# Patient Record
Sex: MALE | Race: BLACK OR AFRICAN AMERICAN | NOT HISPANIC OR LATINO | Employment: FULL TIME | URBAN - METROPOLITAN AREA
[De-identification: names, ages, dates, MRNs, and addresses within clinical notes are randomized per-mention and may not be internally consistent; named-entity substitution may affect disease eponyms.]

---

## 2022-08-04 ENCOUNTER — HOSPITAL ENCOUNTER (OUTPATIENT)
Dept: RADIOLOGY | Facility: CLINIC | Age: 52
Discharge: HOME | End: 2022-08-04
Attending: ORTHOPAEDIC SURGERY
Payer: COMMERCIAL

## 2022-08-04 ENCOUNTER — OFFICE VISIT (OUTPATIENT)
Dept: ORTHOPEDICS | Facility: CLINIC | Age: 52
End: 2022-08-04
Payer: COMMERCIAL

## 2022-08-04 VITALS — HEIGHT: 71 IN | BODY MASS INDEX: 36.49 KG/M2 | WEIGHT: 260.63 LBS

## 2022-08-04 DIAGNOSIS — M25.512 LEFT SHOULDER PAIN, UNSPECIFIED CHRONICITY: ICD-10-CM

## 2022-08-04 DIAGNOSIS — M25.512 LEFT SHOULDER PAIN, UNSPECIFIED CHRONICITY: Primary | ICD-10-CM

## 2022-08-04 DIAGNOSIS — M75.42 IMPINGEMENT SYNDROME OF LEFT SHOULDER: ICD-10-CM

## 2022-08-04 DIAGNOSIS — M75.112 NONTRAUMATIC INCOMPLETE TEAR OF LEFT ROTATOR CUFF: ICD-10-CM

## 2022-08-04 PROCEDURE — 99204 PR OFFICE/OUTPT VISIT, NEW, LEVL IV, 45-59 MIN: ICD-10-PCS | Mod: 25,,, | Performed by: ORTHOPAEDIC SURGERY

## 2022-08-04 PROCEDURE — 1160F PR REVIEW ALL MEDS BY PRESCRIBER/CLIN PHARMACIST DOCUMENTED: ICD-10-PCS | Mod: CPTII,,, | Performed by: ORTHOPAEDIC SURGERY

## 2022-08-04 PROCEDURE — 1159F MED LIST DOCD IN RCRD: CPT | Mod: CPTII,,, | Performed by: ORTHOPAEDIC SURGERY

## 2022-08-04 PROCEDURE — 1160F RVW MEDS BY RX/DR IN RCRD: CPT | Mod: CPTII,,, | Performed by: ORTHOPAEDIC SURGERY

## 2022-08-04 PROCEDURE — 73030 X-RAY EXAM OF SHOULDER: CPT | Mod: LT,,, | Performed by: ORTHOPAEDIC SURGERY

## 2022-08-04 PROCEDURE — 1159F PR MEDICATION LIST DOCUMENTED IN MEDICAL RECORD: ICD-10-PCS | Mod: CPTII,,, | Performed by: ORTHOPAEDIC SURGERY

## 2022-08-04 PROCEDURE — 4010F ACE/ARB THERAPY RXD/TAKEN: CPT | Mod: CPTII,,, | Performed by: ORTHOPAEDIC SURGERY

## 2022-08-04 PROCEDURE — 4010F PR ACE/ARB THEARPY RXD/TAKEN: ICD-10-PCS | Mod: CPTII,,, | Performed by: ORTHOPAEDIC SURGERY

## 2022-08-04 PROCEDURE — 99204 OFFICE O/P NEW MOD 45 MIN: CPT | Mod: 25,,, | Performed by: ORTHOPAEDIC SURGERY

## 2022-08-04 PROCEDURE — 73030 XR SHOULDER COMPLETE 2 OR MORE VIEWS LEFT: ICD-10-PCS | Mod: LT,,, | Performed by: ORTHOPAEDIC SURGERY

## 2022-08-04 PROCEDURE — 20610 DRAIN/INJ JOINT/BURSA W/O US: CPT | Mod: LT,,, | Performed by: ORTHOPAEDIC SURGERY

## 2022-08-04 PROCEDURE — 20610 LARGE JOINT ASPIRATION/INJECTION: L SUBACROMIAL BURSA: ICD-10-PCS | Mod: LT,,, | Performed by: ORTHOPAEDIC SURGERY

## 2022-08-04 PROCEDURE — 3008F BODY MASS INDEX DOCD: CPT | Mod: CPTII,,, | Performed by: ORTHOPAEDIC SURGERY

## 2022-08-04 PROCEDURE — 3008F PR BODY MASS INDEX (BMI) DOCUMENTED: ICD-10-PCS | Mod: CPTII,,, | Performed by: ORTHOPAEDIC SURGERY

## 2022-08-04 RX ORDER — HYDROCODONE BITARTRATE AND ACETAMINOPHEN 10; 325 MG/1; MG/1
1 TABLET ORAL EVERY 8 HOURS PRN
COMMUNITY
Start: 2022-07-19

## 2022-08-04 RX ORDER — AMLODIPINE AND OLMESARTAN MEDOXOMIL 5; 20 MG/1; MG/1
1 TABLET ORAL DAILY
COMMUNITY
Start: 2022-07-20

## 2022-08-04 RX ORDER — OLOPATADINE HYDROCHLORIDE 1 MG/ML
1 SOLUTION/ DROPS OPHTHALMIC 2 TIMES DAILY
COMMUNITY
Start: 2022-03-04

## 2022-08-04 RX ORDER — METFORMIN HYDROCHLORIDE 500 MG/1
500 TABLET ORAL DAILY
COMMUNITY
Start: 2022-05-10

## 2022-08-04 RX ORDER — PRAMIPEXOLE DIHYDROCHLORIDE 1.5 MG/1
1.5 TABLET ORAL NIGHTLY
COMMUNITY
Start: 2022-02-17

## 2022-08-04 RX ORDER — FLUTICASONE PROPIONATE 50 MCG
2 SPRAY, SUSPENSION (ML) NASAL DAILY
COMMUNITY
Start: 2022-02-07

## 2022-08-04 RX ORDER — GLIMEPIRIDE 2 MG/1
2 TABLET ORAL EVERY MORNING
COMMUNITY
Start: 2022-05-26

## 2022-08-04 RX ORDER — CHOLECALCIFEROL (VITAMIN D3) 1250 MCG
50000 CAPSULE ORAL
COMMUNITY
Start: 2022-05-08

## 2022-08-04 RX ORDER — ATORVASTATIN CALCIUM 20 MG/1
20 TABLET, FILM COATED ORAL DAILY
COMMUNITY
Start: 2022-05-10

## 2022-08-04 RX ORDER — LIDOCAINE HYDROCHLORIDE 20 MG/ML
3 INJECTION, SOLUTION INFILTRATION; PERINEURAL
Status: DISCONTINUED | OUTPATIENT
Start: 2022-08-04 | End: 2022-08-04 | Stop reason: HOSPADM

## 2022-08-04 RX ORDER — ROPINIROLE 1 MG/1
1 TABLET, FILM COATED ORAL DAILY
COMMUNITY
Start: 2022-04-22

## 2022-08-04 RX ORDER — CYCLOBENZAPRINE HCL 10 MG
10 TABLET ORAL EVERY 8 HOURS PRN
COMMUNITY
Start: 2022-07-19

## 2022-08-04 RX ORDER — MONTELUKAST SODIUM 10 MG/1
10 TABLET ORAL DAILY
COMMUNITY
Start: 2022-02-07

## 2022-08-04 RX ORDER — BETAMETHASONE SODIUM PHOSPHATE AND BETAMETHASONE ACETATE 3; 3 MG/ML; MG/ML
12 INJECTION, SUSPENSION INTRA-ARTICULAR; INTRALESIONAL; INTRAMUSCULAR; SOFT TISSUE
Status: DISCONTINUED | OUTPATIENT
Start: 2022-08-04 | End: 2022-08-04 | Stop reason: HOSPADM

## 2022-08-04 RX ORDER — LEVOCETIRIZINE DIHYDROCHLORIDE 5 MG/1
5 TABLET, FILM COATED ORAL DAILY
COMMUNITY
Start: 2022-02-07

## 2022-08-04 RX ADMIN — BETAMETHASONE SODIUM PHOSPHATE AND BETAMETHASONE ACETATE 12 MG: 3; 3 INJECTION, SUSPENSION INTRA-ARTICULAR; INTRALESIONAL; INTRAMUSCULAR; SOFT TISSUE at 08:08

## 2022-08-04 RX ADMIN — LIDOCAINE HYDROCHLORIDE 3 MG: 20 INJECTION, SOLUTION INFILTRATION; PERINEURAL at 08:08

## 2022-08-04 NOTE — PROCEDURES
Large Joint Aspiration/Injection: L subacromial bursa    Date/Time: 8/4/2022 8:00 AM  Performed by: Guru Urban MD  Authorized by: Guru Urban MD     Consent Done?:  Yes (Verbal)  Indications:  Pain  Site marked: the procedure site was marked    Timeout: prior to procedure the correct patient, procedure, and site was verified    Prep: patient was prepped and draped in usual sterile fashion    Approach:  Posterior  Location:  Shoulder  Site:  L subacromial bursa  Medications:  12 mg betamethasone acetate-betamethasone sodium phosphate 6 mg/mL; 3 mg LIDOcaine HCL 20 mg/ml (2%) 20 mg/mL (2 %)  Patient tolerance:  Patient tolerated the procedure well with no immediate complications

## 2022-08-04 NOTE — PROGRESS NOTES
Subjective:    CC: Shoulder Pain (LEFT SHOULDER PAIN, HAS BEEN ONGOING FOR A WHILE, DENIES INJURY, SX WAS DONE IN 2020 WITH DR. MARK HAYNES, LIMITED ROM, ALTERNATE WITH HEAT AND ICE TO HELP RELIEF PAIN )       HPI:  Patient comes in today for his 1st visit.  Patient had a previous left shoulder rotator cuff repair 2 years ago.  He states he never really fully regained his full motion.  He continues to have limited motion.  Patient states more recently in the last 8 or 9 months it has gotten worse.  He was seen by another physician as well, had an MRI in 3-4 months ago.  Unfortunately there is no results today.  He continues to have pain and loss of motion of his left shoulder.    ROS: Refer to HPI for pertinent ROS. All other 12 point systems negative.    Objective:    Physical Exam:  Patient is well-nourished and well-developed, in no apparent distress, pleasant and cooperative. Examination of the left upper extremity compartments are soft and warm.  Skin is intact. There are no signs or symptoms of DVT or infection.   Patient is tender to palpation along the  anterolateral aspect .  Patient is able to forward flex and abduct to 95°.  Positive Linton and Neers, positive empty can, negative drop arm test. Negative sulcus sign. Stable to stressing. Neurovascularly intact distally.    Images:  X-rays three views left shoulder demonstrates a previous distal clavicle excision, no obvious fracture dislocation, underlying impingement. Images Reviewed and discussed with patient.    Assessment:  1. Left shoulder pain, unspecified chronicity  - X-Ray Shoulder 2 or More Views Left; Future    2. Impingement syndrome of left shoulder  - Large Joint Aspiration/Injection: L subacromial bursa  - betamethasone acetate-betamethasone sodium phosphate injection 12 mg  - LIDOcaine HCL 20 mg/ml (2%) injection 3 mg    3. Nontraumatic incomplete tear of left rotator cuff  - Large Joint Aspiration/Injection: L subacromial bursa  -  betamethasone acetate-betamethasone sodium phosphate injection 12 mg  - LIDOcaine HCL 20 mg/ml (2%) injection 3 mg        Plan:  At this time we discussed his physical exam and x-ray findings.  We will get his MRI report, most recently, in call him with his results.  The meantime he has tolerated his injection very well to the left shoulder.  We have discussed some shoulder exercises.  Otherwise I would like see him back in a total of 4 weeks to see how he is progressing.    Follow UP: Follow up in about 4 weeks (around 9/1/2022).    Large Joint Aspiration/Injection: L subacromial bursa    Date/Time: 8/4/2022 8:00 AM  Performed by: Guru Urban MD  Authorized by: Guru Urban MD     Consent Done?:  Yes (Verbal)  Indications:  Pain  Site marked: the procedure site was marked    Timeout: prior to procedure the correct patient, procedure, and site was verified    Prep: patient was prepped and draped in usual sterile fashion    Approach:  Posterior  Location:  Shoulder  Site:  L subacromial bursa  Medications:  12 mg betamethasone acetate-betamethasone sodium phosphate 6 mg/mL; 3 mg LIDOcaine HCL 20 mg/ml (2%) 20 mg/mL (2 %)  Patient tolerance:  Patient tolerated the procedure well with no immediate complications

## 2022-09-01 ENCOUNTER — OFFICE VISIT (OUTPATIENT)
Dept: ORTHOPEDICS | Facility: CLINIC | Age: 52
End: 2022-09-01
Payer: COMMERCIAL

## 2022-09-01 VITALS — BODY MASS INDEX: 36.4 KG/M2 | HEIGHT: 71 IN | WEIGHT: 260 LBS

## 2022-09-01 DIAGNOSIS — M75.42 IMPINGEMENT SYNDROME OF LEFT SHOULDER: Primary | ICD-10-CM

## 2022-09-01 DIAGNOSIS — M75.112 NONTRAUMATIC INCOMPLETE TEAR OF LEFT ROTATOR CUFF: ICD-10-CM

## 2022-09-01 PROCEDURE — 4010F PR ACE/ARB THEARPY RXD/TAKEN: ICD-10-PCS | Mod: CPTII,,, | Performed by: ORTHOPAEDIC SURGERY

## 2022-09-01 PROCEDURE — 99213 PR OFFICE/OUTPT VISIT, EST, LEVL III, 20-29 MIN: ICD-10-PCS | Mod: ,,, | Performed by: ORTHOPAEDIC SURGERY

## 2022-09-01 PROCEDURE — 1159F MED LIST DOCD IN RCRD: CPT | Mod: CPTII,,, | Performed by: ORTHOPAEDIC SURGERY

## 2022-09-01 PROCEDURE — 1160F PR REVIEW ALL MEDS BY PRESCRIBER/CLIN PHARMACIST DOCUMENTED: ICD-10-PCS | Mod: CPTII,,, | Performed by: ORTHOPAEDIC SURGERY

## 2022-09-01 PROCEDURE — 1160F RVW MEDS BY RX/DR IN RCRD: CPT | Mod: CPTII,,, | Performed by: ORTHOPAEDIC SURGERY

## 2022-09-01 PROCEDURE — 3008F PR BODY MASS INDEX (BMI) DOCUMENTED: ICD-10-PCS | Mod: CPTII,,, | Performed by: ORTHOPAEDIC SURGERY

## 2022-09-01 PROCEDURE — 4010F ACE/ARB THERAPY RXD/TAKEN: CPT | Mod: CPTII,,, | Performed by: ORTHOPAEDIC SURGERY

## 2022-09-01 PROCEDURE — 3008F BODY MASS INDEX DOCD: CPT | Mod: CPTII,,, | Performed by: ORTHOPAEDIC SURGERY

## 2022-09-01 PROCEDURE — 1159F PR MEDICATION LIST DOCUMENTED IN MEDICAL RECORD: ICD-10-PCS | Mod: CPTII,,, | Performed by: ORTHOPAEDIC SURGERY

## 2022-09-01 PROCEDURE — 99213 OFFICE O/P EST LOW 20 MIN: CPT | Mod: ,,, | Performed by: ORTHOPAEDIC SURGERY

## 2022-09-01 RX ORDER — MELOXICAM 15 MG/1
15 TABLET ORAL DAILY
Qty: 30 TABLET | Refills: 0 | Status: SHIPPED | OUTPATIENT
Start: 2022-09-01

## 2022-09-01 NOTE — PROGRESS NOTES
Subjective:    CC: Pain of the Left Shoulder and Follow-up (L shoulder inj 8/4/22 - pt states that his shoulder is feeling a little better since the inj - td)       HPI:  Patient returns today for repeat exam.  We have discussed his previous MRI.  He states he is improving    ROS: Refer to HPI for pertinent ROS. All other 12 point systems negative.    Objective:    Physical Exam:  Patient is well-nourished and well-developed, in no apparent distress, pleasant and cooperative. Examination of the left upper extremity compartments are soft and warm.  Skin is intact. There are no signs or symptoms of DVT or infection.   Patient is tender to palpation along the  lateral aspect .  Patient is able to forward flex and abduct to 140.  Mildly positive Linton and Neers, negative empty can, negative drop arm test. Negative sulcus sign. Stable to stressing. Neurovascularly intact distally.    Images: . Images Reviewed and discussed with patient.    Assessment:  1. Impingement syndrome of left shoulder    2. Nontraumatic incomplete tear of left rotator cuff        Plan:  At this time we discussed his physical exam and previous imaging findings.  Patient continues to improve with conservative treatment.  We will continue to watch him closely.  I would like see him back and 2 months to see how he is progressing.  We have discussed anti-inflammatories with appropriate precautions as well.    Follow UP: No follow-ups on file.

## 2022-11-03 ENCOUNTER — OFFICE VISIT (OUTPATIENT)
Dept: ORTHOPEDICS | Facility: CLINIC | Age: 52
End: 2022-11-03
Payer: COMMERCIAL

## 2022-11-03 VITALS
BODY MASS INDEX: 34.02 KG/M2 | SYSTOLIC BLOOD PRESSURE: 129 MMHG | HEIGHT: 71 IN | DIASTOLIC BLOOD PRESSURE: 83 MMHG | HEART RATE: 73 BPM | WEIGHT: 243 LBS

## 2022-11-03 DIAGNOSIS — M75.112 NONTRAUMATIC INCOMPLETE TEAR OF LEFT ROTATOR CUFF: ICD-10-CM

## 2022-11-03 DIAGNOSIS — M75.42 IMPINGEMENT SYNDROME OF LEFT SHOULDER: Primary | ICD-10-CM

## 2022-11-03 PROCEDURE — 1160F PR REVIEW ALL MEDS BY PRESCRIBER/CLIN PHARMACIST DOCUMENTED: ICD-10-PCS | Mod: CPTII,,, | Performed by: ORTHOPAEDIC SURGERY

## 2022-11-03 PROCEDURE — 3079F DIAST BP 80-89 MM HG: CPT | Mod: CPTII,,, | Performed by: ORTHOPAEDIC SURGERY

## 2022-11-03 PROCEDURE — 1159F PR MEDICATION LIST DOCUMENTED IN MEDICAL RECORD: ICD-10-PCS | Mod: CPTII,,, | Performed by: ORTHOPAEDIC SURGERY

## 2022-11-03 PROCEDURE — 4010F ACE/ARB THERAPY RXD/TAKEN: CPT | Mod: CPTII,,, | Performed by: ORTHOPAEDIC SURGERY

## 2022-11-03 PROCEDURE — 3008F PR BODY MASS INDEX (BMI) DOCUMENTED: ICD-10-PCS | Mod: CPTII,,, | Performed by: ORTHOPAEDIC SURGERY

## 2022-11-03 PROCEDURE — 3074F SYST BP LT 130 MM HG: CPT | Mod: CPTII,,, | Performed by: ORTHOPAEDIC SURGERY

## 2022-11-03 PROCEDURE — 20610 LARGE JOINT ASPIRATION/INJECTION: L SUBACROMIAL BURSA: ICD-10-PCS | Mod: LT,,, | Performed by: ORTHOPAEDIC SURGERY

## 2022-11-03 PROCEDURE — 3008F BODY MASS INDEX DOCD: CPT | Mod: CPTII,,, | Performed by: ORTHOPAEDIC SURGERY

## 2022-11-03 PROCEDURE — 4010F PR ACE/ARB THEARPY RXD/TAKEN: ICD-10-PCS | Mod: CPTII,,, | Performed by: ORTHOPAEDIC SURGERY

## 2022-11-03 PROCEDURE — 99213 PR OFFICE/OUTPT VISIT, EST, LEVL III, 20-29 MIN: ICD-10-PCS | Mod: 25,,, | Performed by: ORTHOPAEDIC SURGERY

## 2022-11-03 PROCEDURE — 99213 OFFICE O/P EST LOW 20 MIN: CPT | Mod: 25,,, | Performed by: ORTHOPAEDIC SURGERY

## 2022-11-03 PROCEDURE — 20610 DRAIN/INJ JOINT/BURSA W/O US: CPT | Mod: LT,,, | Performed by: ORTHOPAEDIC SURGERY

## 2022-11-03 PROCEDURE — 1159F MED LIST DOCD IN RCRD: CPT | Mod: CPTII,,, | Performed by: ORTHOPAEDIC SURGERY

## 2022-11-03 PROCEDURE — 3074F PR MOST RECENT SYSTOLIC BLOOD PRESSURE < 130 MM HG: ICD-10-PCS | Mod: CPTII,,, | Performed by: ORTHOPAEDIC SURGERY

## 2022-11-03 PROCEDURE — 3079F PR MOST RECENT DIASTOLIC BLOOD PRESSURE 80-89 MM HG: ICD-10-PCS | Mod: CPTII,,, | Performed by: ORTHOPAEDIC SURGERY

## 2022-11-03 PROCEDURE — 1160F RVW MEDS BY RX/DR IN RCRD: CPT | Mod: CPTII,,, | Performed by: ORTHOPAEDIC SURGERY

## 2022-11-03 RX ORDER — FLUTICASONE FUROATE 50 UG/1
1 POWDER RESPIRATORY (INHALATION) DAILY
COMMUNITY
Start: 2022-10-28

## 2022-11-03 RX ORDER — LIDOCAINE HYDROCHLORIDE 20 MG/ML
3 INJECTION, SOLUTION INFILTRATION; PERINEURAL
Status: DISCONTINUED | OUTPATIENT
Start: 2022-11-03 | End: 2022-11-03 | Stop reason: HOSPADM

## 2022-11-03 RX ORDER — BETAMETHASONE SODIUM PHOSPHATE AND BETAMETHASONE ACETATE 3; 3 MG/ML; MG/ML
12 INJECTION, SUSPENSION INTRA-ARTICULAR; INTRALESIONAL; INTRAMUSCULAR; SOFT TISSUE
Status: DISCONTINUED | OUTPATIENT
Start: 2022-11-03 | End: 2022-11-03 | Stop reason: HOSPADM

## 2022-11-03 RX ADMIN — BETAMETHASONE SODIUM PHOSPHATE AND BETAMETHASONE ACETATE 12 MG: 3; 3 INJECTION, SUSPENSION INTRA-ARTICULAR; INTRALESIONAL; INTRAMUSCULAR; SOFT TISSUE at 09:11

## 2022-11-03 RX ADMIN — LIDOCAINE HYDROCHLORIDE 3 MG: 20 INJECTION, SOLUTION INFILTRATION; PERINEURAL at 09:11

## 2022-11-03 NOTE — PROGRESS NOTES
"Subjective:    CC: Injections of the Left Shoulder (Left shoulder inj 8/4/22, pt having some pain says he's ready for another injection )       HPI:  Patient returns today for repeat exam.  Patient states overall he continues to improve with his left shoulder.  He has had previous shoulder surgery, outside facility.  He has also had a previous follow-up MRI, outside facility.  He has been going to physical therapy making progress.  He would like to repeat his injection today.    ROS: Refer to HPI for pertinent ROS. All other 12 point systems negative.    Objective:  Vitals:    11/03/22 0908   BP: 129/83   Pulse: 73   Weight: 110.2 kg (243 lb)   Height: 5' 11" (1.803 m)        Physical Exam:  Patient is well-nourished and well-developed, in no apparent distress, pleasant and cooperative. Examination of the left upper extremity compartments are soft and warm.  Skin is intact. There are no signs or symptoms of DVT or infection.   Patient is tender to palpation along the  lateral aspect .  Patient is able to forward flex and abduct to 140.  Mildly positive Linton and Neers, mild positive empty can, negative drop arm test. Negative sulcus sign. Stable to stressing. Neurovascularly intact distally.    Images: . Images Reviewed and discussed with patient.    Assessment:  1. Impingement syndrome of left shoulder  - Large Joint Aspiration/Injection: L subacromial bursa    2. Nontraumatic incomplete tear of left rotator cuff  - Large Joint Aspiration/Injection: L subacromial bursa        Plan:  At this time we discussed his physical exam and previous imaging findings.  He tolerated his injection well the left shoulder he will continue his therapy exercises.  I would like see back in 3 months to see how he is progressing.    Follow UP: No follow-ups on file.    Large Joint Aspiration/Injection: L subacromial bursa    Date/Time: 11/3/2022 9:30 AM  Performed by: Guru Urban MD  Authorized by: Guru Urban MD     Consent " Done?:  Yes (Verbal)  Indications:  Pain  Site marked: the procedure site was marked    Timeout: prior to procedure the correct patient, procedure, and site was verified    Prep: patient was prepped and draped in usual sterile fashion    Approach:  Posterior  Location:  Shoulder  Site:  L subacromial bursa  Medications:  12 mg betamethasone acetate-betamethasone sodium phosphate 6 mg/mL; 3 mg LIDOcaine HCL 20 mg/ml (2%) 20 mg/mL (2 %)  Patient tolerance:  Patient tolerated the procedure well with no immediate complications

## 2022-11-03 NOTE — PROCEDURES
Large Joint Aspiration/Injection: L subacromial bursa    Date/Time: 11/3/2022 9:30 AM  Performed by: Guru Urban MD  Authorized by: Guru Urban MD     Consent Done?:  Yes (Verbal)  Indications:  Pain  Site marked: the procedure site was marked    Timeout: prior to procedure the correct patient, procedure, and site was verified    Prep: patient was prepped and draped in usual sterile fashion    Approach:  Posterior  Location:  Shoulder  Site:  L subacromial bursa  Medications:  12 mg betamethasone acetate-betamethasone sodium phosphate 6 mg/mL; 3 mg LIDOcaine HCL 20 mg/ml (2%) 20 mg/mL (2 %)  Patient tolerance:  Patient tolerated the procedure well with no immediate complications

## 2023-02-02 ENCOUNTER — OFFICE VISIT (OUTPATIENT)
Dept: ORTHOPEDICS | Facility: CLINIC | Age: 53
End: 2023-02-02
Payer: COMMERCIAL

## 2023-02-02 VITALS — BODY MASS INDEX: 34.02 KG/M2 | WEIGHT: 243 LBS | HEIGHT: 71 IN

## 2023-02-02 DIAGNOSIS — M75.42 IMPINGEMENT SYNDROME OF LEFT SHOULDER: Primary | ICD-10-CM

## 2023-02-02 DIAGNOSIS — M75.112 NONTRAUMATIC INCOMPLETE TEAR OF LEFT ROTATOR CUFF: ICD-10-CM

## 2023-02-02 PROCEDURE — 3008F BODY MASS INDEX DOCD: CPT | Mod: CPTII,,, | Performed by: ORTHOPAEDIC SURGERY

## 2023-02-02 PROCEDURE — 1160F PR REVIEW ALL MEDS BY PRESCRIBER/CLIN PHARMACIST DOCUMENTED: ICD-10-PCS | Mod: CPTII,,, | Performed by: ORTHOPAEDIC SURGERY

## 2023-02-02 PROCEDURE — 20610 DRAIN/INJ JOINT/BURSA W/O US: CPT | Mod: LT,,, | Performed by: ORTHOPAEDIC SURGERY

## 2023-02-02 PROCEDURE — 3008F PR BODY MASS INDEX (BMI) DOCUMENTED: ICD-10-PCS | Mod: CPTII,,, | Performed by: ORTHOPAEDIC SURGERY

## 2023-02-02 PROCEDURE — 99213 PR OFFICE/OUTPT VISIT, EST, LEVL III, 20-29 MIN: ICD-10-PCS | Mod: 25,,, | Performed by: ORTHOPAEDIC SURGERY

## 2023-02-02 PROCEDURE — 99213 OFFICE O/P EST LOW 20 MIN: CPT | Mod: 25,,, | Performed by: ORTHOPAEDIC SURGERY

## 2023-02-02 PROCEDURE — 1160F RVW MEDS BY RX/DR IN RCRD: CPT | Mod: CPTII,,, | Performed by: ORTHOPAEDIC SURGERY

## 2023-02-02 PROCEDURE — 20610 LARGE JOINT ASPIRATION/INJECTION: L SUBACROMIAL BURSA: ICD-10-PCS | Mod: LT,,, | Performed by: ORTHOPAEDIC SURGERY

## 2023-02-02 PROCEDURE — 1159F PR MEDICATION LIST DOCUMENTED IN MEDICAL RECORD: ICD-10-PCS | Mod: CPTII,,, | Performed by: ORTHOPAEDIC SURGERY

## 2023-02-02 PROCEDURE — 1159F MED LIST DOCD IN RCRD: CPT | Mod: CPTII,,, | Performed by: ORTHOPAEDIC SURGERY

## 2023-02-02 RX ORDER — LIDOCAINE HYDROCHLORIDE 20 MG/ML
3 INJECTION, SOLUTION INFILTRATION; PERINEURAL
Status: DISCONTINUED | OUTPATIENT
Start: 2023-02-02 | End: 2023-02-02 | Stop reason: HOSPADM

## 2023-02-02 RX ORDER — BETAMETHASONE SODIUM PHOSPHATE AND BETAMETHASONE ACETATE 3; 3 MG/ML; MG/ML
12 INJECTION, SUSPENSION INTRA-ARTICULAR; INTRALESIONAL; INTRAMUSCULAR; SOFT TISSUE
Status: DISCONTINUED | OUTPATIENT
Start: 2023-02-02 | End: 2023-02-02 | Stop reason: HOSPADM

## 2023-02-02 RX ADMIN — LIDOCAINE HYDROCHLORIDE 3 MG: 20 INJECTION, SOLUTION INFILTRATION; PERINEURAL at 09:02

## 2023-02-02 RX ADMIN — BETAMETHASONE SODIUM PHOSPHATE AND BETAMETHASONE ACETATE 12 MG: 3; 3 INJECTION, SUSPENSION INTRA-ARTICULAR; INTRALESIONAL; INTRAMUSCULAR; SOFT TISSUE at 09:02

## 2023-02-02 NOTE — PROCEDURES
Large Joint Aspiration/Injection: L subacromial bursa    Date/Time: 2/2/2023 9:00 AM  Performed by: Guru Urban MD  Authorized by: Guru Urban MD     Consent Done?:  Yes (Verbal)  Indications:  Pain  Site marked: the procedure site was marked    Timeout: prior to procedure the correct patient, procedure, and site was verified    Prep: patient was prepped and draped in usual sterile fashion    Approach:  Posterior  Location:  Shoulder  Site:  L subacromial bursa  Medications:  12 mg betamethasone acetate-betamethasone sodium phosphate 6 mg/mL; 3 mg LIDOcaine HCL 20 mg/ml (2%) 20 mg/mL (2 %)  Patient tolerance:  Patient tolerated the procedure well with no immediate complications

## 2023-02-02 NOTE — PROGRESS NOTES
"Subjective:    CC: Follow-up (lt shoulder inject 11/3/22 with relief started to wear off about a couple weeks. possible injection today. )       HPI:  Patient returns today for repeat exam.  Patient states he does get good relief with the injections.  We have discussed his previous MRI.  He would like to hold off on surgery.  He denies any new complaints.    ROS: Refer to HPI for pertinent ROS. All other 12 point systems negative.    Objective:  Vitals:    02/02/23 0922   Weight: 110.2 kg (243 lb)   Height: 5' 11" (1.803 m)        Physical Exam:  Patient is well-nourished and well-developed, in no apparent distress, pleasant and cooperative. Examination of the left upper extremity compartments are soft and warm.  Skin is intact. There are no signs or symptoms of DVT or infection.   Patient is tender to palpation along the  anterolateral aspect .  Patient is able to forward flex and abduct to 150.  Mildly positive Linton and Neers, positive empty can, negative drop arm test. Negative sulcus sign. Stable to stressing. Neurovascularly intact distally.    Images: . Images Reviewed and discussed with patient.    Assessment:  1. Impingement syndrome of left shoulder  - Large Joint Aspiration/Injection: L subacromial bursa    2. Nontraumatic incomplete tear of left rotator cuff  - Large Joint Aspiration/Injection: L subacromial bursa        Plan:  At this time we discussed his physical exam and previous imaging findings.  He tolerated his injection very well to left shoulder, we have discussed some shoulder range of motion strengthening exercises.  I would like see back in 3 months to see how he is progressing.    Follow UP: Follow up in about 3 months (around 5/2/2023).    Large Joint Aspiration/Injection: L subacromial bursa    Date/Time: 2/2/2023 9:00 AM  Performed by: Guru Urban MD  Authorized by: Guru Urban MD     Consent Done?:  Yes (Verbal)  Indications:  Pain  Site marked: the procedure site was marked  "   Timeout: prior to procedure the correct patient, procedure, and site was verified    Prep: patient was prepped and draped in usual sterile fashion    Approach:  Posterior  Location:  Shoulder  Site:  L subacromial bursa  Medications:  12 mg betamethasone acetate-betamethasone sodium phosphate 6 mg/mL; 3 mg LIDOcaine HCL 20 mg/ml (2%) 20 mg/mL (2 %)  Patient tolerance:  Patient tolerated the procedure well with no immediate complications

## 2023-05-04 ENCOUNTER — OFFICE VISIT (OUTPATIENT)
Dept: ORTHOPEDICS | Facility: CLINIC | Age: 53
End: 2023-05-04
Payer: COMMERCIAL

## 2023-05-04 DIAGNOSIS — M75.42 IMPINGEMENT SYNDROME OF LEFT SHOULDER: Primary | ICD-10-CM

## 2023-05-04 DIAGNOSIS — M75.112 NONTRAUMATIC INCOMPLETE TEAR OF LEFT ROTATOR CUFF: ICD-10-CM

## 2023-05-04 PROCEDURE — 1160F PR REVIEW ALL MEDS BY PRESCRIBER/CLIN PHARMACIST DOCUMENTED: ICD-10-PCS | Mod: CPTII,,, | Performed by: ORTHOPAEDIC SURGERY

## 2023-05-04 PROCEDURE — 1159F PR MEDICATION LIST DOCUMENTED IN MEDICAL RECORD: ICD-10-PCS | Mod: CPTII,,, | Performed by: ORTHOPAEDIC SURGERY

## 2023-05-04 PROCEDURE — 1159F MED LIST DOCD IN RCRD: CPT | Mod: CPTII,,, | Performed by: ORTHOPAEDIC SURGERY

## 2023-05-04 PROCEDURE — 99213 PR OFFICE/OUTPT VISIT, EST, LEVL III, 20-29 MIN: ICD-10-PCS | Mod: ,,, | Performed by: ORTHOPAEDIC SURGERY

## 2023-05-04 PROCEDURE — 1160F RVW MEDS BY RX/DR IN RCRD: CPT | Mod: CPTII,,, | Performed by: ORTHOPAEDIC SURGERY

## 2023-05-04 PROCEDURE — 99213 OFFICE O/P EST LOW 20 MIN: CPT | Mod: ,,, | Performed by: ORTHOPAEDIC SURGERY

## 2023-05-04 RX ORDER — PROMETHAZINE HYDROCHLORIDE 25 MG/1
25 TABLET ORAL EVERY 12 HOURS PRN
COMMUNITY
Start: 2023-04-23

## 2023-05-04 NOTE — PROGRESS NOTES
Subjective:    CC: Pain of the Left Shoulder (LT shoulder injection 2/2/23. States that it did help. ROM is improved a little. Complaints of slight pain. )       HPI:  Patient returns today for repeat exam.  Patient states overall his left shoulder is doing well.  His range of motion has improved, he has very minimal if any pain.  His previous injection has helped a lot.    ROS: Refer to HPI for pertinent ROS. All other 12 point systems negative.    Objective:  There were no vitals filed for this visit.     Physical Exam:  Patient is well-nourished and well-developed, in no apparent distress, pleasant and cooperative. Examination of the left upper extremity compartments are soft and warm.  Skin is intact. There are no signs or symptoms of DVT or infection.   Patient is tender to palpation along the  lateral aspect .  Patient is able to forward flex and abduct to 160.  Negative Linton and Neers, negative empty can, negative drop arm test. Negative sulcus sign. Stable to stressing. Neurovascularly intact distally.    Images: . Images Reviewed and discussed with patient.    Assessment:  1. Impingement syndrome of left shoulder    2. Nontraumatic incomplete tear of left rotator cuff        Plan:  At this time we discussed his physical exam and previous x-ray findings.  Patient is asymptomatic today.  We will hold off on an MRI.  He has regained a lot of his motion, remains be very asymptomatic at this time.  He will continue with the shoulder range of motion strengthening exercises.  He understands to call or return sooner if there is any new problems or difficulties.    Follow UP: No follow-ups on file.